# Patient Record
Sex: MALE | Race: BLACK OR AFRICAN AMERICAN | URBAN - METROPOLITAN AREA
[De-identification: names, ages, dates, MRNs, and addresses within clinical notes are randomized per-mention and may not be internally consistent; named-entity substitution may affect disease eponyms.]

---

## 2018-03-27 ENCOUNTER — TRANSFERRED RECORDS (OUTPATIENT)
Dept: HEALTH INFORMATION MANAGEMENT | Facility: CLINIC | Age: 28
End: 2018-03-27

## 2018-09-19 ENCOUNTER — HOSPITAL ENCOUNTER (EMERGENCY)
Facility: CLINIC | Age: 28
Discharge: HOME | End: 2018-09-19
Attending: EMERGENCY MEDICINE | Admitting: EMERGENCY MEDICINE

## 2018-09-19 VITALS
SYSTOLIC BLOOD PRESSURE: 197 MMHG | TEMPERATURE: 98.7 F | BODY MASS INDEX: 23.7 KG/M2 | DIASTOLIC BLOOD PRESSURE: 136 MMHG | OXYGEN SATURATION: 100 % | RESPIRATION RATE: 18 BRPM | WEIGHT: 175 LBS | HEIGHT: 72 IN

## 2018-09-19 DIAGNOSIS — I10 HYPERTENSION, UNSPECIFIED TYPE: ICD-10-CM

## 2018-09-19 DIAGNOSIS — L03.90 CELLULITIS, UNSPECIFIED CELLULITIS SITE: ICD-10-CM

## 2018-09-19 DIAGNOSIS — L73.9 FOLLICULITIS: ICD-10-CM

## 2018-09-19 PROCEDURE — 76857 US EXAM PELVIC LIMITED: CPT

## 2018-09-19 PROCEDURE — 99284 EMERGENCY DEPT VISIT MOD MDM: CPT | Mod: 25

## 2018-09-19 RX ORDER — CEPHALEXIN 500 MG/1
500 CAPSULE ORAL 3 TIMES DAILY
Qty: 21 CAPSULE | Refills: 0 | Status: SHIPPED | OUTPATIENT
Start: 2018-09-19 | End: 2018-09-26

## 2018-09-19 RX ORDER — TRIAMTERENE AND HYDROCHLOROTHIAZIDE 37.5; 25 MG/1; MG/1
1 CAPSULE ORAL DAILY
Qty: 30 CAPSULE | Refills: 0 | Status: SHIPPED | OUTPATIENT
Start: 2018-09-19

## 2018-09-19 ASSESSMENT — ENCOUNTER SYMPTOMS
FEVER: 0
WOUND: 1

## 2018-09-19 NOTE — ED PROVIDER NOTES
History     Chief Complaint:  Abscess    HPI   Lion Alberto is a 27 year old male who presents with a ?abscess. The patient recently traveled here from [place] on a work trip. The patient says that he was bitten by an unknown bug on his buttocks. The bite site has been red and swollen for three days now (right buttock). He has not noted drainage from the wounds and has not had any fevers. No other symptoms.  There are also bug bites on the left buttock.  Patient states he has a history of HTN but cannot remember what medicine he is supposed to be taking.  He ran out of the medicine and has not yet followed up.    Allergies:  No known drug allergies     Medications:    The patient is not currently taking any prescribed medications.    Past Medical History:    Hypertension    Past Surgical History:    History reviewed. No pertinent surgical history.    Family History:    History reviewed. No pertinent family history.     Social History:  Smoking Status: Never Smoker  Alcohol Use: Yes  Patient presents by himself.    Review of Systems   Constitutional: Negative for fever.   Skin: Positive for wound.   All other systems reviewed and are negative.    Physical Exam     Patient Vitals for the past 24 hrs:   BP Temp Temp src Heart Rate Resp SpO2 Height Weight   09/19/18 1552 (!) 151/101 98.7  F (37.1  C) Temporal 85 18 100 % 1.829 m (6') 79.4 kg (175 lb)     Physical Exam   Musculoskeletal:        Back:      GEN: patient smiling, no distress  HEAD: atraumatic, normocephalic  EYES: pupils reactive, conjunctivae normal  ENT: TMs flat and white bilaterally, oropharynx normal with no erythema or exudate, mucus membranes moist  NECK: no cervical LAD  RESPIRATORY: no tachypnea, breath sounds clear to auscultation (no rales, wheezes, rhonchi)  CVS: normal S1/S2, no murmurs/rubs/gallops  ABDOMEN: soft, nontender, no masses  EXTREMITIES: intact pulses x 2 (radial pulses intact), no edema  SKIN: warm and dry,  several healed bite areas on left buttock.  Right buttock with small 203 area of bite with surrounding slight redness.  No palpable pus or fluctuance.  NEURO:   Overall symmetrical exam  HEME: no bruising     Emergency Department Course     Imaging:  Radiographic findings were communicated with the patient who voiced understanding of the findings.    POC Ultrasound Soft Tissue:  No evidence of abscess (just cellulitis)      Procedures:  ED BEDSIDE SOFT TISSUE ULTRASOUND:     Indication: swelling- cellulitis vs abscess; warmth and edema    Performed by: Dr. Gallardo     Probe used: high-frequency linear probe (small parts)    Area of body: Buttocks    Findings: Negative for free fluid in the visualized areas.   No cobblestoning or edema to suggest abscess.    Impression: No evidence of abscess (just cellulitis)    Archiving: The quality of the exam was good.  The images were printed and attached for inclusion in the patients medical record. Images also saved to hard-drive    Emergency Department Course:  Past medical records, nursing notes, and vitals reviewed.  1653: I performed an exam of the patient and obtained history, as documented above.    I performed a bedside ultrasound. Findings above.    1734: I rechecked the patient. Findings and plan explained to the Patient. Patient discharged home with instructions regarding supportive care, medications, and reasons to return. The importance of close follow-up was reviewed.     BP (!) 146/93  Temp 98.7  F (37.1  C) (Temporal)  Resp 18  Ht 1.829 m (6')  Wt 79.4 kg (175 lb)  SpO2 100%  BMI 23.73 kg/m2  Could not get a hold of the medication report from [facility].     Impression & Plan      Medical Decision Making:  Lion Alberto is a 27 year old male who has redness on his right buttock, but also has several bug bites located on the left buttock. On examination, he has evidence of cellulitis and possibly folliculitis, but no evidence of deep abscess.  We were able to use bedside ultrasound to show that there's no obvious abscess. The patient also has high blood pressure, is from [place], and unclear what medication he is supposed to be taking. I will refer him back to the [facility] system to follow up in 7 days and we will start him on Dyazide. The plan is to continue to hydrate, push fluids, and watch for signs and symptoms of infection.        Diagnosis:    ICD-10-CM    1. Folliculitis L73.9    2. Cellulitis, unspecified cellulitis site L03.90    3. Hypertension, unspecified type I10      Disposition:  Discharged to home.    Discharge Medications:  New Prescriptions    CEPHALEXIN (KEFLEX) 500 MG CAPSULE    Take 1 capsule (500 mg) by mouth 3 times daily for 7 days    TRIAMTERENE-HYDROCHLOROTHIAZIDE (DYAZIDE) 37.5-25 MG PER CAPSULE    Take 1 capsule by mouth daily     Instructions to patient:  Take the antibiotics.    .OK to sit in warm water several times per day (Sitz baths).    Watch for increasing redness, drainage, fevers, swelling (sign of infection).    Inspect daily for signs of infection.    Recheck blood pressure in 1 week with PCP (referral for  clinics).    Colin Butcher  9/19/2018   [facility] EMERGENCY DEPARTMENT    Scribe Disclosure:  I, Colin Butcher, am serving as a scribe at 4:53 PM on 9/19/2018 to document services personally performed by Kanika Gallardo MD based on my observations and the provider's statements to me.           Kanika Gallardo MD  09/20/18 5389

## 2018-09-19 NOTE — ED AVS SNAPSHOT
[facility] Emergency Department    [address]    [facility] [address]    Phone:  [phone]    Fax:  [phone]                                       Lion Alberto   MRN: [id number]    Department:  [facility] Emergency Department   Date of Visit:  9/19/2018           After Visit Summary Signature Page     I have received my discharge instructions, and my questions have been answered. I have discussed any challenges I see with this plan with the nurse or doctor.    ..........................................................................................................................................  Patient/Patient Representative Signature      ..........................................................................................................................................  Patient Representative Print Name and Relationship to Patient    ..................................................               ................................................  Date                                   Time    ..........................................................................................................................................  Reviewed by Signature/Title    ...................................................              ..............................................  Date                                               Time          22EPIC Rev 08/18

## 2018-09-19 NOTE — DISCHARGE INSTRUCTIONS
Take the antibiotics.    .OK to sit in warm water several times per day (Sitz baths).    Watch for increasing redness, drainage, fevers, swelling (sign of infection).    Inspect daily for signs of infection.    Recheck blood pressure in 1 week with PCP (referral for [facility] clinics).

## 2018-09-19 NOTE — LETTER
September 19, 2018      To Whom It May Concern:      Lion Alberto was seen in our Emergency Department today, 09/19/18.  I expect his condition to improve over the next 2 days.  He may return to work/school when improved.    Sincerely,        Jackie Ann RN

## 2018-09-19 NOTE — ED NOTES
Pt became emotional and started crying discussing hypertension and medications during discharge instructions.  Pt provided with handout of clinic locations to assist in making availability for follow-up easier.

## 2018-09-19 NOTE — ED AVS SNAPSHOT
[facility] Emergency Department    [address]    [facility] [address]    Phone:  [phone]    Fax:  [phone]                                       Loin Alberto   MRN: [id number]    Department:  [facility] Emergency Department   Date of Visit:  9/19/2018           Patient Information     Date Of Birth          1990        Your diagnoses for this visit were:     Folliculitis     Cellulitis, unspecified cellulitis site     Hypertension, unspecified type        You were seen by Kanika Gallardo MD.      Follow-up Information     Follow up with [facility]    Contact information:    [address]  [facility] [address]  [phone]        Discharge Instructions       Take the antibiotics.    .OK to sit in warm water several times per day (Sitz baths).    Watch for increasing redness, drainage, fevers, swelling (sign of infection).    Inspect daily for signs of infection.    Recheck blood pressure in 1 week with PCP (referral for  clinics).    Discharge References/Attachments     HIGH BLOOD PRESSURE, ESTABLISHED, OUT OF CONTROL (ENGLISH)    HIGH BLOOD PRESSURE, CONTROLLING (ENGLISH)    FOLLICULITIS (ENGLISH)      24 Hour Appointment Hotline       To make an appointment at any [facility] clinic, call [phone] ([phone]). If you don't have a family doctor or clinic, we will help you find one. [facility] clinics are conveniently located to serve the needs of you and your family.             Review of your medicines      START taking        Dose / Directions Last dose taken    cephALEXin 500 MG capsule   Commonly known as:  KEFLEX   Dose:  500 mg   Quantity:  21 capsule        Take 1 capsule (500 mg) by mouth 3 times daily for 7 days   Refills:  0        triamterene-hydrochlorothiazide 37.5-25 MG per capsule   Commonly known as:  DYAZIDE   Dose:  1 capsule   Quantity:  30 capsule        Take 1 capsule by mouth daily    Refills:  0                Prescriptions were sent or printed at these locations (2 Prescriptions)                   Other Prescriptions                Printed at Department/Unit printer (2 of 2)         cephALEXin (KEFLEX) 500 MG capsule               triamterene-hydrochlorothiazide (DYAZIDE) 37.5-25 MG per capsule                Procedures and tests performed during your visit     POC US SOFT TISSUE      Orders Needing Specimen Collection     None      Pending Results     Date and Time Order Name Status Description    9/19/2018 1656 POC US SOFT TISSUE In process             Pending Culture Results     No orders found from 9/17/2018 to 9/20/2018.            Pending Results Instructions     If you had any lab results that were not finalized at the time of your Discharge, you can call the ED Lab Result RN at [phone]. You will be contacted by this team for any positive Lab results or changes in treatment. The nurses are available 7 days a week from 10A to 6:30P.  You can leave a message 24 hours per day and they will return your call.        Test Results From Your Hospital Stay        9/19/2018  5:06 PM      Impression     ED BEDSIDE SOFT TISSUE ULTRASOUND:     Indication: swelling- cellulitis vs abscess; warmth and edema    Performed by: Dr. Gallardo     Probe used: high-frequency linear probe (small parts)    Area of body:     Findings: Negative for free fluid in the visualized areas.   No cobblestoning or edema to suggest abscess.    Impression: No evidence of abscess (just cellulitis)    Archiving: The quality of the exam was good.  The images were printed and attached for inclusion in the patients medical record. Images also saved to hard-drive                Clinical Quality Measure: Blood Pressure Screening     Your blood pressure was checked while you were in the emergency department today. The last reading we obtained was  BP: (!) 146/93 . Please read the guidelines below about what these numbers mean  "and what you should do about them.  If your systolic blood pressure (the top number) is less than 120 and your diastolic blood pressure (the bottom number) is less than 80, then your blood pressure is normal. There is nothing more that you need to do about it.  If your systolic blood pressure (the top number) is 120-139 or your diastolic blood pressure (the bottom number) is 80-89, your blood pressure may be higher than it should be. You should have your blood pressure rechecked within a year by a primary care provider.  If your systolic blood pressure (the top number) is 140 or greater or your diastolic blood pressure (the bottom number) is 90 or greater, you may have high blood pressure. High blood pressure is treatable, but if left untreated over time it can put you at risk for heart attack, stroke, or kidney failure. You should have your blood pressure rechecked by a primary care provider within the next 4 weeks.  If your provider in the emergency department today gave you specific instructions to follow-up with your doctor or provider even sooner than that, you should follow that instruction and not wait for up to 4 weeks for your follow-up visit.        Thank you for choosing [facility]       Thank you for choosing [facility] for your care. Our goal is always to provide you with excellent care. Hearing back from our patients is one way we can continue to improve our services. Please take a few minutes to complete the written survey that you may receive in the mail after you visit with us. Thank you!        [employer] Information     [employer] lets you send messages to your doctor, view your test results, renew your prescriptions, schedule appointments and more. To sign up, go to [email or web address] . Click on \"Log in\" on the left side of the screen, which will take you to the Welcome page. Then click on \"Sign up Now\" on the right side of the page.     You will be asked to enter the access code listed below, as " well as some personal information. Please follow the directions to create your username and password.     Your access code is: [email or web address]  Expires: 2018  5:37 PM     Your access code will  in 90 days. If you need help or a new code, please call your [facility] clinic or [phone].        Care EveryWhere ID     This is your Care EveryWhere ID. This could be used by other organizations to access your [facility] medical records  [id number]        Equal Access to Services     [place] JEFFERY : Sepideh sierra Soyissel, waaxsiva luqadaha, qaybelisabet kaalmasiva capone, malini dickey . So [facility] [phone].    ATENCIÓN: Si habla español, tiene a zambrano disposición servicios gratuitos de asistencia lingüística. Llame al [phone].    We comply with applicable federal civil rights laws and [place] laws. We do not discriminate on the basis of race, color, national origin, age, disability, sex, sexual orientation, or gender identity.            After Visit Summary       This is your record. Keep this with you and show to your community pharmacist(s) and doctor(s) at your next visit.